# Patient Record
Sex: FEMALE | Race: WHITE | NOT HISPANIC OR LATINO
[De-identification: names, ages, dates, MRNs, and addresses within clinical notes are randomized per-mention and may not be internally consistent; named-entity substitution may affect disease eponyms.]

---

## 2024-11-04 ENCOUNTER — DASHBOARD ENCOUNTERS (OUTPATIENT)
Age: 19
End: 2024-11-04

## 2024-11-04 ENCOUNTER — OFFICE VISIT (OUTPATIENT)
Dept: URBAN - METROPOLITAN AREA CLINIC 17 | Facility: CLINIC | Age: 19
End: 2024-11-04
Payer: COMMERCIAL

## 2024-11-04 VITALS
TEMPERATURE: 97.5 F | HEIGHT: 58 IN | DIASTOLIC BLOOD PRESSURE: 78 MMHG | SYSTOLIC BLOOD PRESSURE: 119 MMHG | WEIGHT: 114 LBS | HEART RATE: 71 BPM | BODY MASS INDEX: 23.93 KG/M2

## 2024-11-04 DIAGNOSIS — K21.9 GASTROESOPHAGEAL REFLUX DISEASE, UNSPECIFIED WHETHER ESOPHAGITIS PRESENT: ICD-10-CM

## 2024-11-04 DIAGNOSIS — K50.811 CROHN'S DISEASE OF BOTH SMALL AND LARGE INTESTINE WITH RECTAL BLEEDING: ICD-10-CM

## 2024-11-04 PROBLEM — 235595009: Status: ACTIVE | Noted: 2024-11-04

## 2024-11-04 PROCEDURE — 99204 OFFICE O/P NEW MOD 45 MIN: CPT | Performed by: INTERNAL MEDICINE

## 2024-11-04 RX ORDER — OMEPRAZOLE 20 MG/1
1 CAPSULE 1/2 TO 1 HOUR BEFORE MORNING MEAL CAPSULE, DELAYED RELEASE ORAL ONCE A DAY
Status: ACTIVE | COMMUNITY

## 2024-11-04 RX ORDER — CLINDAMYCIN PHOSPHATE 10 MG/ML
1 APPLICATION LOTION TOPICAL TWICE A DAY
Status: ACTIVE | COMMUNITY

## 2024-11-04 RX ORDER — OMEPRAZOLE 20 MG/1
1 CAPSULE 1/2 TO 1 HOUR BEFORE MORNING MEAL CAPSULE, DELAYED RELEASE ORAL ONCE A DAY
Qty: 90 | Refills: 1 | OUTPATIENT
Start: 2024-11-04

## 2024-11-04 RX ORDER — MESALAMINE 1.2 G/1
2 TABLETS WITH A MEAL TABLET, DELAYED RELEASE ORAL ONCE A DAY
Status: ACTIVE | COMMUNITY

## 2024-11-04 RX ORDER — MESALAMINE 1.2 G/1
2 TABLETS WITH A MEAL TABLET, DELAYED RELEASE ORAL TWICE A DAY
Qty: 360 TABLET | Refills: 3 | OUTPATIENT
Start: 2024-11-04 | End: 2025-10-30

## 2024-11-04 RX ORDER — TAZAROTENE 1 MG/G
1 APPLICATION IN THE EVENING CREAM TOPICAL ONCE A DAY
Status: ACTIVE | COMMUNITY

## 2024-11-04 RX ORDER — SPIRONOLACTONE 100 MG/1
1 TABLET TABLET, FILM COATED ORAL ONCE A DAY
Status: ACTIVE | COMMUNITY

## 2024-11-04 NOTE — HPI-TODAY'S VISIT:
New patient 19-year-old female with PMH of Crohn's disease (diagnosed in 2020, on Apriso 2 tablets BID since her diagnosis) here for Crohn's management/establishment of care.  Recently moved from [place] this past August.  Last EGD/colon was 06/25/2024  BX was negative for enteritis, colon bx negative for active inflammation Has hx of GERD, on Omeprazole 20 mg daily.  Symptoms include abdominal pain daily. Described as cramping.  She has been off of her mesalimine for the past month.  Has between 2-5 BMs a day. Will have some rectal bleeding and mucus intermittently in the stool. Last instance was a few days ago.

## 2024-11-06 LAB
A/G RATIO: 1.7
ABSOLUTE BASOPHILS: 30
ABSOLUTE EOSINOPHILS: 48
ABSOLUTE LYMPHOCYTES: 1626
ABSOLUTE MONOCYTES: 450
ABSOLUTE NEUTROPHILS: 3846
ALBUMIN: 4.7
ALKALINE PHOSPHATASE: 64
ALT (SGPT): 16
AST (SGOT): 18
BASOPHILS: 0.5
BILIRUBIN, TOTAL: 0.8
BUN/CREATININE RATIO: (no result)
BUN: 9
CALCIUM: 9.8
CARBON DIOXIDE, TOTAL: 28
CHLORIDE: 103
CREATININE: 0.59
EGFR: 132
EOSINOPHILS: 0.8
FERRITIN, SERUM: 21
FOLATE, SERUM: 17.5
GLOBULIN, TOTAL: 2.7
GLUCOSE: 86
HEMATOCRIT: 39.5
HEMOGLOBIN: 13.4
HEPATITIS B CORE AB TOTAL: (no result)
HEPATITIS B SURFACE AB IMMUNITY, QN: 12
HEPATITIS B SURFACE ANTIGEN: (no result)
IRON BIND.CAP.(TIBC): 366
IRON SATURATION: 20
IRON: 73
LYMPHOCYTES: 27.1
MCH: 31.9
MCHC: 33.9
MCV: 94
MITOGEN-NIL: 7.29
MONOCYTES: 7.5
MPV: 9.3
NEUTROPHILS: 64.1
PLATELET COUNT: 394
POTASSIUM: 4.1
PROTEIN, TOTAL: 7.4
QUANTIFERON NIL VALUE: 0.01
QUANTIFERON TB1 AG VALUE: 0
QUANTIFERON TB2 AG VALUE: 0
QUANTIFERON-TB GOLD PLUS: NEGATIVE
RDW: 13.3
RED BLOOD CELL COUNT: 4.2
SODIUM: 141
VITAMIN B12: 552
WHITE BLOOD CELL COUNT: 6

## 2024-11-18 PROBLEM — 71833008: Status: ACTIVE | Noted: 2024-11-04

## 2024-11-20 ENCOUNTER — LAB OUTSIDE AN ENCOUNTER (OUTPATIENT)
Dept: URBAN - METROPOLITAN AREA CLINIC 105 | Facility: CLINIC | Age: 19
End: 2024-11-20

## 2024-11-25 LAB
ADENOVIRUS F 40/41: NOT DETECTED
CALPROTECTIN, STOOL - QDX: (no result)
CAMPYLOBACTER: NOT DETECTED
CLOSTRIDIUM DIFFICILE: NOT DETECTED
ENTAMOEBA HISTOLYTICA: NOT DETECTED
ENTEROAGGREGATIVE E.COLI: NOT DETECTED
ENTEROTOXIGENIC E.COLI: NOT DETECTED
ESCHERICHIA COLI O157: NOT DETECTED
GIARDIA LAMBLIA: NOT DETECTED
NOROVIRUS GI/GII: NOT DETECTED
ROTAVIRUS A: NOT DETECTED
SALMONELLA SPP.: NOT DETECTED
SHIGA-LIKE TOXIN PRODUCING E.COLI: NOT DETECTED
SHIGELLA SPP. / ENTEROINVASIVE E.COLI: NOT DETECTED
VIBRIO PARAHAEMOLYTICUS: NOT DETECTED
VIBRIO SPP.: NOT DETECTED
YERSINIA ENTEROCOLITICA: NOT DETECTED

## 2025-01-13 ENCOUNTER — OFFICE VISIT (OUTPATIENT)
Dept: URBAN - METROPOLITAN AREA TELEHEALTH 2 | Facility: TELEHEALTH | Age: 20
End: 2025-01-13

## 2025-02-27 ENCOUNTER — OFFICE VISIT (OUTPATIENT)
Dept: URBAN - METROPOLITAN AREA CLINIC 17 | Facility: CLINIC | Age: 20
End: 2025-02-27

## 2025-02-27 VITALS
WEIGHT: 111.2 LBS | TEMPERATURE: 97.2 F | HEART RATE: 81 BPM | HEIGHT: 58 IN | SYSTOLIC BLOOD PRESSURE: 112 MMHG | DIASTOLIC BLOOD PRESSURE: 77 MMHG | BODY MASS INDEX: 23.34 KG/M2

## 2025-02-27 RX ORDER — VONOPRAZAN FUMARATE 13.36 MG/1
1 TABLET TABLET ORAL ONCE A DAY
Qty: 90 TABLET | Refills: 1 | OUTPATIENT
Start: 2025-02-27 | End: 2025-08-26

## 2025-02-27 RX ORDER — SPIRONOLACTONE 100 MG/1
1 TABLET TABLET, FILM COATED ORAL ONCE A DAY
Status: ACTIVE | COMMUNITY

## 2025-02-27 RX ORDER — MESALAMINE 1.2 G/1
2 TABLETS WITH A MEAL TABLET, DELAYED RELEASE ORAL ONCE A DAY
Status: ACTIVE | COMMUNITY

## 2025-02-27 RX ORDER — OMEPRAZOLE 20 MG/1
1 CAPSULE 1/2 TO 1 HOUR BEFORE MORNING MEAL CAPSULE, DELAYED RELEASE ORAL ONCE A DAY
Status: ACTIVE | COMMUNITY

## 2025-02-27 RX ORDER — TAZAROTENE 1 MG/G
1 APPLICATION IN THE EVENING CREAM TOPICAL ONCE A DAY
Status: ACTIVE | COMMUNITY

## 2025-02-27 RX ORDER — MESALAMINE 1.2 G/1
2 TABLETS WITH A MEAL TABLET, DELAYED RELEASE ORAL TWICE A DAY
Qty: 360 | Refills: 0
Start: 2024-11-04 | End: 2025-05-28

## 2025-02-27 RX ORDER — OMEPRAZOLE 20 MG/1
1 CAPSULE 1/2 TO 1 HOUR BEFORE MORNING MEAL CAPSULE, DELAYED RELEASE ORAL ONCE A DAY
Qty: 90 | Refills: 1 | Status: ACTIVE | COMMUNITY
Start: 2024-11-04

## 2025-02-27 RX ORDER — MESALAMINE 1.2 G/1
2 TABLETS WITH A MEAL TABLET, DELAYED RELEASE ORAL TWICE A DAY
Qty: 360 TABLET | Refills: 3 | Status: ACTIVE | COMMUNITY
Start: 2024-11-04 | End: 2025-10-30

## 2025-02-27 RX ORDER — CLINDAMYCIN PHOSPHATE 10 MG/ML
1 APPLICATION LOTION TOPICAL TWICE A DAY
Status: ACTIVE | COMMUNITY

## 2025-02-27 NOTE — HPI-TODAY'S VISIT:
11/04/24 New patient 19-year-old female with PMH of Crohn's disease (diagnosed in 2020, on Apriso 2 tablets BID since her diagnosis) here for Crohn's management/establishment of care.  Recently moved from [place] this past August.  Last EGD/colon was 06/25/2024  BX was negative for enteritis, colon bx negative for active inflammation Has hx of GERD, on Omeprazole 20 mg daily.  Symptoms include abdominal pain daily. Described as cramping.  She has been off of her mesalimine for the past month.  Has between 2-5 BMs a day. Will have some rectal bleeding and mucus intermittently in the stool. Last instance was a few days ago.  02/27/25 Here as a follow up for GERD and Crohn's. Ran out of Apriso "a while ago".  Denies abdominal pain.  Did notice "a tiny bit of blood" on the TP.  Having 4 BMs a day of loose-liquid stools.  Having perimubilical burning sensation. Not associated with eating. Taking Omeprazole 80 mg daily. She is adhering to the antireflux precuations.  Had an EGD/COlon last June.

## 2025-03-27 ENCOUNTER — OFFICE VISIT (OUTPATIENT)
Dept: URBAN - METROPOLITAN AREA CLINIC 17 | Facility: CLINIC | Age: 20
End: 2025-03-27

## 2025-04-04 ENCOUNTER — OFFICE VISIT (OUTPATIENT)
Dept: URBAN - METROPOLITAN AREA CLINIC 17 | Facility: CLINIC | Age: 20
End: 2025-04-04

## 2025-04-16 ENCOUNTER — LAB OUTSIDE AN ENCOUNTER (OUTPATIENT)
Dept: URBAN - METROPOLITAN AREA CLINIC 17 | Facility: CLINIC | Age: 20
End: 2025-04-16

## 2025-04-16 ENCOUNTER — OFFICE VISIT (OUTPATIENT)
Dept: URBAN - METROPOLITAN AREA CLINIC 17 | Facility: CLINIC | Age: 20
End: 2025-04-16
Payer: COMMERCIAL

## 2025-04-16 DIAGNOSIS — K50.811 CROHN'S DISEASE OF BOTH SMALL AND LARGE INTESTINE WITH RECTAL BLEEDING: ICD-10-CM

## 2025-04-16 DIAGNOSIS — K59.04 CHRONIC IDIOPATHIC CONSTIPATION: ICD-10-CM

## 2025-04-16 DIAGNOSIS — M62.89 PELVIC FLOOR DYSFUNCTION: ICD-10-CM

## 2025-04-16 DIAGNOSIS — K21.9 GASTROESOPHAGEAL REFLUX DISEASE, UNSPECIFIED WHETHER ESOPHAGITIS PRESENT: ICD-10-CM

## 2025-04-16 PROBLEM — 82934008: Status: ACTIVE | Noted: 2025-04-16

## 2025-04-16 PROCEDURE — 99214 OFFICE O/P EST MOD 30 MIN: CPT | Performed by: INTERNAL MEDICINE

## 2025-04-16 RX ORDER — VONOPRAZAN FUMARATE 13.36 MG/1
1 TABLET TABLET ORAL ONCE A DAY
Qty: 90 TABLET | Refills: 1 | Status: ACTIVE | COMMUNITY
Start: 2025-02-27 | End: 2025-08-26

## 2025-04-16 RX ORDER — SPIRONOLACTONE 100 MG/1
1 TABLET TABLET, FILM COATED ORAL ONCE A DAY
Status: ACTIVE | COMMUNITY

## 2025-04-16 RX ORDER — CLINDAMYCIN PHOSPHATE 10 MG/ML
1 APPLICATION LOTION TOPICAL TWICE A DAY
Status: ACTIVE | COMMUNITY

## 2025-04-16 RX ORDER — OMEPRAZOLE 20 MG/1
1 CAPSULE 1/2 TO 1 HOUR BEFORE MORNING MEAL CAPSULE, DELAYED RELEASE ORAL ONCE A DAY
Status: ACTIVE | COMMUNITY

## 2025-04-16 RX ORDER — TAZAROTENE 1 MG/G
1 APPLICATION IN THE EVENING CREAM TOPICAL ONCE A DAY
Status: ACTIVE | COMMUNITY

## 2025-04-16 RX ORDER — MESALAMINE 1.2 G/1
2 TABLETS WITH A MEAL TABLET, DELAYED RELEASE ORAL TWICE A DAY
Qty: 360 | Refills: 0 | Status: ACTIVE | COMMUNITY
Start: 2024-11-04 | End: 2025-05-28

## 2025-04-16 RX ORDER — MESALAMINE 1.2 G/1
2 TABLETS WITH A MEAL TABLET, DELAYED RELEASE ORAL ONCE A DAY
Status: ACTIVE | COMMUNITY

## 2025-04-16 RX ORDER — OMEPRAZOLE 20 MG/1
1 CAPSULE 1/2 TO 1 HOUR BEFORE MORNING MEAL CAPSULE, DELAYED RELEASE ORAL ONCE A DAY
Qty: 90 | Refills: 0

## 2025-04-16 RX ORDER — LUBIPROSTONE 24 UG/1
1 CAPSULE WITH FOOD AND WATER CAPSULE, GELATIN COATED ORAL TWICE A DAY
Qty: 180 CAPSULE | Refills: 3 | OUTPATIENT
Start: 2025-04-16 | End: 2026-04-11

## 2025-04-16 RX ORDER — OMEPRAZOLE 20 MG/1
1 CAPSULE 1/2 TO 1 HOUR BEFORE MORNING MEAL CAPSULE, DELAYED RELEASE ORAL ONCE A DAY
Qty: 90 | Refills: 1 | Status: ACTIVE | COMMUNITY
Start: 2024-11-04

## 2025-04-16 NOTE — HPI-TODAY'S VISIT:
11/04/24 New patient 19-year-old female with PMH of Crohn's disease (diagnosed in 2020, on Apriso 2 tablets BID since her diagnosis) here for Crohn's management/establishment of care.  Recently moved from [place] this past August.  Last EGD/colon was 06/25/2024  BX was negative for enteritis, colon bx negative for active inflammation Has hx of GERD, on Omeprazole 20 mg daily.  Symptoms include abdominal pain daily. Described as cramping.  She has been off of her mesalamine for the past month.  Has between 2-5 BMs a day. Will have some rectal bleeding and mucus intermittently in the stool. Last instance was a few days ago.  02/27/25 Here as a follow-up for GERD and Crohn's. Ran out of Apriso "a while ago".  Denies abdominal pain.  Did notice "a tiny bit of blood" on the TP.  Having 4 BMs a day of loose-liquid stools.  Having periumbilical burning sensation. Not associated with eating. Taking Omeprazole 80 mg daily. She is adhering to the antireflux precautions.  Had an EGD/Colon last June in [place]. Records still not available for my review.  04/16/25 Here for a follow up.  Omeprazole 20 mg daily is controlling GERD symptoms. Voquezna was not covered.  Reports constipation. Doesn't have a BM for up to 5 days, until using a laxative x a few weeks. Using Dulcolax PRN.  Not having periods. This is ongoing since 2023. She has been on BC in the past for regulation.  Having lower back pain started a few weeks ago while walking Denies nausea, vomiting, nausea, melena and rectal bleeding. Patient brought in 100 page worth of records for my review.  Per previous encounters, patient has a complicated history of Crohn's colitis that required a diverting ileostomy in the past. It was reversed, and she was managed with oral mesalamine and was clinically in remission. She has seen PT in the past for pelvic floor dysfunction/ dysynerrgic defecation. She has history of autism and growth hormone deficiency for which she has taken growth hormone in the past. She has history of Hashimoto's thyroiditis. Last EGD/colonoscopy was 06/25/2024 noting a erosive esophagitis LA grade B and a normal colonoscopy. Recommended repeat in 3 years.  BX was negative for colitis and enteritis.

## 2025-06-10 ENCOUNTER — CLAIMS CREATED FROM THE CLAIM WINDOW (OUTPATIENT)
Dept: URBAN - METROPOLITAN AREA SURGERY CENTER 16 | Facility: SURGERY CENTER | Age: 20
End: 2025-06-10
Payer: COMMERCIAL

## 2025-06-10 ENCOUNTER — TELEPHONE ENCOUNTER (OUTPATIENT)
Dept: URBAN - METROPOLITAN AREA CLINIC 105 | Facility: CLINIC | Age: 20
End: 2025-06-10

## 2025-06-10 ENCOUNTER — LAB OUTSIDE AN ENCOUNTER (OUTPATIENT)
Dept: URBAN - METROPOLITAN AREA CLINIC 105 | Facility: CLINIC | Age: 20
End: 2025-06-10

## 2025-06-10 DIAGNOSIS — K50.90 CROHN DISEASE: ICD-10-CM

## 2025-06-10 DIAGNOSIS — K21.9 ACID REFLUX: ICD-10-CM

## 2025-06-10 DIAGNOSIS — K21.9 ESOPHAGEAL REFLUX: ICD-10-CM

## 2025-06-10 DIAGNOSIS — K50.80 CROHN'S COLITIS: ICD-10-CM

## 2025-06-10 DIAGNOSIS — K29.60 OTHER GASTRITIS WITHOUT BLEEDING: ICD-10-CM

## 2025-06-10 PROCEDURE — 45380 COLONOSCOPY AND BIOPSY: CPT | Performed by: INTERNAL MEDICINE

## 2025-06-10 PROCEDURE — 43239 EGD BIOPSY SINGLE/MULTIPLE: CPT | Performed by: INTERNAL MEDICINE

## 2025-06-10 PROCEDURE — 00813 ANES UPR LWR GI NDSC PX: CPT | Performed by: ANESTHESIOLOGIST ASSISTANT

## 2025-06-10 PROCEDURE — 00813 ANES UPR LWR GI NDSC PX: CPT | Performed by: ANESTHESIOLOGY

## 2025-06-10 RX ORDER — TAZAROTENE 1 MG/G
1 APPLICATION IN THE EVENING CREAM TOPICAL ONCE A DAY
Status: ACTIVE | COMMUNITY

## 2025-06-10 RX ORDER — OMEPRAZOLE 20 MG/1
1 CAPSULE 1/2 TO 1 HOUR BEFORE MORNING MEAL CAPSULE, DELAYED RELEASE ORAL ONCE A DAY
Qty: 90 | Refills: 0 | Status: ACTIVE | COMMUNITY

## 2025-06-10 RX ORDER — CLINDAMYCIN PHOSPHATE 10 MG/ML
1 APPLICATION LOTION TOPICAL TWICE A DAY
Status: ACTIVE | COMMUNITY

## 2025-06-10 RX ORDER — VONOPRAZAN FUMARATE 13.36 MG/1
1 TABLET TABLET ORAL ONCE A DAY
Qty: 90 TABLET | Refills: 1 | Status: ACTIVE | COMMUNITY
Start: 2025-02-27 | End: 2025-08-26

## 2025-06-10 RX ORDER — OMEPRAZOLE 20 MG/1
1 CAPSULE 1/2 TO 1 HOUR BEFORE MORNING MEAL CAPSULE, DELAYED RELEASE ORAL ONCE A DAY
Qty: 90 | Refills: 1 | Status: ACTIVE | COMMUNITY
Start: 2024-11-04

## 2025-06-10 RX ORDER — MESALAMINE 1.2 G/1
2 TABLETS WITH A MEAL TABLET, DELAYED RELEASE ORAL ONCE A DAY
Status: ACTIVE | COMMUNITY

## 2025-06-10 RX ORDER — SPIRONOLACTONE 100 MG/1
1 TABLET TABLET, FILM COATED ORAL ONCE A DAY
Status: ACTIVE | COMMUNITY

## 2025-06-10 RX ORDER — LUBIPROSTONE 24 UG/1
1 CAPSULE WITH FOOD AND WATER CAPSULE, GELATIN COATED ORAL TWICE A DAY
Qty: 180 CAPSULE | Refills: 3 | Status: ACTIVE | COMMUNITY
Start: 2025-04-16 | End: 2026-04-11

## 2025-06-26 ENCOUNTER — OFFICE VISIT (OUTPATIENT)
Dept: URBAN - METROPOLITAN AREA CLINIC 17 | Facility: CLINIC | Age: 20
End: 2025-06-26
Payer: COMMERCIAL

## 2025-06-26 DIAGNOSIS — Z98.890 HX OF ILEOSTOMY: ICD-10-CM

## 2025-06-26 DIAGNOSIS — K59.04 CHRONIC IDIOPATHIC CONSTIPATION: ICD-10-CM

## 2025-06-26 DIAGNOSIS — M62.89 PELVIC FLOOR DYSFUNCTION: ICD-10-CM

## 2025-06-26 DIAGNOSIS — K50.811 CROHN'S DISEASE OF BOTH SMALL AND LARGE INTESTINE WITH RECTAL BLEEDING: ICD-10-CM

## 2025-06-26 DIAGNOSIS — K62.5 RECTAL BLEEDING: ICD-10-CM

## 2025-06-26 DIAGNOSIS — K21.9 GASTROESOPHAGEAL REFLUX DISEASE, UNSPECIFIED WHETHER ESOPHAGITIS PRESENT: ICD-10-CM

## 2025-06-26 PROCEDURE — 99213 OFFICE O/P EST LOW 20 MIN: CPT | Performed by: INTERNAL MEDICINE

## 2025-06-26 RX ORDER — VONOPRAZAN FUMARATE 13.36 MG/1
1 TABLET TABLET ORAL ONCE A DAY
Qty: 90 TABLET | Refills: 1 | Status: ON HOLD | COMMUNITY
Start: 2025-02-27 | End: 2025-08-26

## 2025-06-26 RX ORDER — OMEPRAZOLE 20 MG/1
1 CAPSULE 1/2 TO 1 HOUR BEFORE MORNING MEAL CAPSULE, DELAYED RELEASE ORAL ONCE A DAY
Qty: 90 | Refills: 1 | Status: ON HOLD | COMMUNITY
Start: 2024-11-04

## 2025-06-26 RX ORDER — MESALAMINE 1.2 G/1
2 TABLETS WITH A MEAL TABLET, DELAYED RELEASE ORAL ONCE A DAY
Status: ON HOLD | COMMUNITY

## 2025-06-26 RX ORDER — LUBIPROSTONE 24 UG/1
1 CAPSULE WITH FOOD AND WATER CAPSULE, GELATIN COATED ORAL TWICE A DAY
Qty: 180 CAPSULE | Refills: 3 | Status: ON HOLD | COMMUNITY
Start: 2025-04-16 | End: 2026-04-11

## 2025-06-26 RX ORDER — TAZAROTENE 1 MG/G
1 APPLICATION IN THE EVENING CREAM TOPICAL ONCE A DAY
Status: ACTIVE | COMMUNITY

## 2025-06-26 RX ORDER — OMEPRAZOLE 20 MG/1
1 CAPSULE 1/2 TO 1 HOUR BEFORE MORNING MEAL CAPSULE, DELAYED RELEASE ORAL ONCE A DAY
Qty: 90 | Refills: 0 | Status: ON HOLD | COMMUNITY

## 2025-06-26 RX ORDER — CLINDAMYCIN PHOSPHATE 10 MG/ML
1 APPLICATION LOTION TOPICAL TWICE A DAY
Status: ACTIVE | COMMUNITY

## 2025-06-26 RX ORDER — SPIRONOLACTONE 100 MG/1
1 TABLET TABLET, FILM COATED ORAL ONCE A DAY
Status: ACTIVE | COMMUNITY

## 2025-06-26 NOTE — HPI-TODAY'S VISIT:
11/04/24 New patient 19-year-old female with PMH of Crohn's disease (diagnosed in 2020, on Apriso 2 tablets BID since her diagnosis) here for Crohn's management/establishment of care.  Recently moved from [place] this past August.  Last EGD/colon was 06/25/2024  BX was negative for enteritis, colon bx negative for active inflammation Has hx of GERD, on Omeprazole 20 mg daily.  Symptoms include abdominal pain daily. Described as cramping.  She has been off of her mesalamine for the past month.  Has between 2-5 BMs a day. Will have some rectal bleeding and mucus intermittently in the stool. Last instance was a few days ago.  02/27/25 Here as a follow-up for GERD and Crohn's. Ran out of Apriso "a while ago".  Denies abdominal pain.  Did notice "a tiny bit of blood" on the TP.  Having 4 BMs a day of loose-liquid stools.  Having periumbilical burning sensation. Not associated with eating. Taking Omeprazole 80 mg daily. She is adhering to the antireflux precautions.  Had an EGD/Colon last June in [place]. Records still not available for my review.  04/16/25 Here for a follow up.  Omeprazole 20 mg daily is controlling GERD symptoms. Voquezna was not covered.  Reports constipation. Doesn't have a BM for up to 5 days, until using a laxative x a few weeks. Using Dulcolax PRN.  Not having periods. This is ongoing since 2023. She has been on BC in the past for regulation.  Having lower back pain started a few weeks ago while walking Denies nausea, vomiting, nausea, melena and rectal bleeding. Patient brought in 100 page worth of records for my review.  Per previous encounters, patient has a complicated history of Crohn's colitis that required a diverting ileostomy in the past. It was reversed, and she was managed with oral mesalamine and was clinically in remission. She has seen PT in the past for pelvic floor dysfunction/ dysynerrgic defecation. She has history of autism and growth hormone deficiency for which she has taken growth hormone in the past. She has history of Hashimoto's thyroiditis. Last EGD/colonoscopy was 06/25/2024 noting a erosive esophagitis LA grade B and a normal colonoscopy. Recommended repeat in 3 years.  BX was negative for colitis and enteritis.  06/26/25 Here for a follow up with her mother. DIscussed EGD/Colonoscopy. Not having GERD symptoms. Notes some intermittent rectal bleeding with wiping.  Having constipation and straining.  States Amitiza made her dizzy. Did not get MRE done. They state they decline having it done.  Discussed risks of missed dx of SB Crohn's.